# Patient Record
Sex: MALE | Race: WHITE | NOT HISPANIC OR LATINO | Employment: OTHER
[De-identification: names, ages, dates, MRNs, and addresses within clinical notes are randomized per-mention and may not be internally consistent; named-entity substitution may affect disease eponyms.]

---

## 2021-07-09 ENCOUNTER — RECORDS - HEALTHEAST (OUTPATIENT)
Dept: ADMINISTRATIVE | Facility: OTHER | Age: 76
End: 2021-07-09

## 2021-07-11 PROBLEM — E11.621 DIABETIC ULCER OF RIGHT HEEL ASSOCIATED WITH TYPE 2 DIABETES MELLITUS, LIMITED TO BREAKDOWN OF SKIN (H): Status: ACTIVE | Noted: 2021-07-07

## 2021-07-11 PROBLEM — L97.411 DIABETIC ULCER OF RIGHT HEEL ASSOCIATED WITH TYPE 2 DIABETES MELLITUS, LIMITED TO BREAKDOWN OF SKIN (H): Status: ACTIVE | Noted: 2021-07-07

## 2021-07-28 ENCOUNTER — OFFICE VISIT (OUTPATIENT)
Dept: VASCULAR SURGERY | Facility: CLINIC | Age: 76
End: 2021-07-28
Attending: PODIATRIST
Payer: MEDICARE

## 2021-07-28 VITALS — RESPIRATION RATE: 16 BRPM | HEART RATE: 60 BPM | DIASTOLIC BLOOD PRESSURE: 60 MMHG | SYSTOLIC BLOOD PRESSURE: 110 MMHG

## 2021-07-28 DIAGNOSIS — E11.621 DIABETIC ULCER OF RIGHT HEEL ASSOCIATED WITH TYPE 2 DIABETES MELLITUS, LIMITED TO BREAKDOWN OF SKIN (H): Primary | ICD-10-CM

## 2021-07-28 DIAGNOSIS — L97.411 DIABETIC ULCER OF RIGHT HEEL ASSOCIATED WITH TYPE 2 DIABETES MELLITUS, LIMITED TO BREAKDOWN OF SKIN (H): Primary | ICD-10-CM

## 2021-07-28 PROCEDURE — 11042 DBRDMT SUBQ TIS 1ST 20SQCM/<: CPT | Performed by: PODIATRIST

## 2021-07-28 ASSESSMENT — PAIN SCALES - GENERAL: PAINLEVEL: MODERATE PAIN (5)

## 2021-07-28 NOTE — PROGRESS NOTES
FOOT AND ANKLE SURGERY/PODIATRY Progress Note      ASSESSMENT: Diabetic Ulceration right heel        TREATMENT:  -The right heel ulceration is stable. I recommend continued limited walking with use of medihoney.     -After discussion of risk factors and consent obtained 2% Lidocaine HCL jelly was applied, under clean conditions, the right and foot ulceration(s) were debrided using .into the subcutaneous tissue.  Devitalized and nonviable tissue, along with any fibrin and slough, was removed to improve granulation tissue formation, stimulate wound healing, decrease overall bacteria load, disrupt biofilm formation and decrease edge senescence. Wound drainage was scant No. Total excisional debridement was 0.2 sq cm into the subcutaneous tissue with a depth of 0.2 cm.   Ulcers were improved afterwards and .  Measures were as noted on the flow sheet. Medi-honey with a gauze dressing was applied. He will continue to apply Medi-honey with a gauze dressing qoday.    -He will follow-up in 3 weeks.    Wilbur Falcon DPM  [facility]      HPI: Ulises Swenson was seen again today for a right heel ulceration. Since his last visit, he has tried to remain limited walking on the right foot.      No past medical history on file.    Past Surgical History:   Procedure Laterality Date     ARTHROSCOPY KNEE Right      COLECTOMY SUBTOTAL       HERNIA REPAIR, UMBILICAL      2x     PROSTATECTOMY         Allergies   Allergen Reactions     Lexapro [Escitalopram] Nausea and Vomiting     Sulfa (Sulfonamide Antibiotics) [Sulfa Drugs] Other (See Comments)         Current Outpatient Medications:      ALLERGY RELIEF, LORATADINE, 10 mg tablet, [ALLERGY RELIEF, LORATADINE, 10 MG TABLET] Take 10 mg by mouth daily., Disp: , Rfl:      amLODIPine (NORVASC) 10 MG tablet, [AMLODIPINE (NORVASC) 10 MG TABLET] Take 10 mg by mouth daily. As directed, Disp: , Rfl:      COMBIVENT RESPIMAT  mcg/actuation Mist inhaler,  [COMBIVENT RESPIMAT  MCG/ACTUATION MIST INHALER] INHALE 1 PUFF BY MOUTH 4 TIMES A DAY AS NEEDED, Disp: , Rfl:      fluticasone propionate (FLONASE) 50 mcg/actuation nasal spray, [FLUTICASONE PROPIONATE (FLONASE) 50 MCG/ACTUATION NASAL SPRAY] Spray 2 spray into both nostrils twice a day as needed Angle to your sinuses, Disp: , Rfl:      furosemide (LASIX) 40 MG tablet, [FUROSEMIDE (LASIX) 40 MG TABLET] TAKE 2 TABLETS BY MOUTH once daily AS NEEDED, Disp: , Rfl:      levothyroxine (SYNTHROID, LEVOTHROID) 100 MCG tablet, [LEVOTHYROXINE (SYNTHROID, LEVOTHROID) 100 MCG TABLET] Take 100 mcg by mouth daily., Disp: , Rfl:      lisinopriL (PRINIVIL,ZESTRIL) 40 MG tablet, [LISINOPRIL (PRINIVIL,ZESTRIL) 40 MG TABLET] Take 40 mg by mouth 2 (two) times a day., Disp: , Rfl:      meloxicam (MOBIC) 7.5 MG tablet, [MELOXICAM (MOBIC) 7.5 MG TABLET] Take 7.5 mg by mouth daily., Disp: , Rfl:      metOLazone (ZAROXOLYN) 5 MG tablet, [METOLAZONE (ZAROXOLYN) 5 MG TABLET] TAKE 1 TABLET BY MOUTH EVERY MORNING AS NEEDED, Disp: , Rfl:      metoprolol tartrate (LOPRESSOR) 25 MG tablet, [METOPROLOL TARTRATE (LOPRESSOR) 25 MG TABLET] Take 25 mg by mouth 2 (two) times a day., Disp: , Rfl:      sertraline (ZOLOFT) 25 MG tablet, [SERTRALINE (ZOLOFT) 25 MG TABLET] TAKE 1/2 TABLET BY MOUTH DAILY FOR 1 WEEK, THEN 1 TABLET DAILY FOR 1 WEEK, THEN TAKE 2 TABLETS once daily FOR 1 WEEK THEREAFTER, Disp: , Rfl:      simvastatin (ZOCOR) 20 MG tablet, [SIMVASTATIN (ZOCOR) 20 MG TABLET] Take 1 tablet by mouth every night, Disp: , Rfl:      spironolactone (ALDACTONE) 25 MG tablet, [SPIRONOLACTONE (ALDACTONE) 25 MG TABLET] Take 25 mg by mouth daily., Disp: , Rfl:      terazosin (HYTRIN) 5 MG capsule, [TERAZOSIN (HYTRIN) 5 MG CAPSULE] take 1 tablet (5 mg) by oral route once daily at bedtime, Disp: , Rfl:      VENTOLIN HFA 90 mcg/actuation inhaler, [VENTOLIN HFA 90 MCG/ACTUATION INHALER] Inhale 2 puffs every 4 (four) hours as needed., Disp: , Rfl:       warfarin ANTICOAGULANT (COUMADIN/JANTOVEN) 7.5 MG tablet, [WARFARIN ANTICOAGULANT (COUMADIN/JANTOVEN) 7.5 MG TABLET] TAKE 1 TABLET (7.5mg) BY MOUTH EVERY DAY EXCEPT TAKE 1/2 TABLET (3.75mg) ON wednsedays AND saturdays OR as directed by  clinic, Disp: , Rfl:     Review of Systems - 10 point Review of Systems is negative except for right heel ulcer which is noted in HPI.      OBJECTIVE:  /60   Pulse 60   Resp 16   General appearance: Patient is alert and fully cooperative with history & exam.  No sign of distress is noted during the visit.    Vascular: Dorsalis pedis palpableRight.  Dermatologic:    VASC Wound right heel  (Active)   Pre Size Length 0.4 07/28/21 1500   Pre Size Width 0.5 07/28/21 1500   Pre Size Depth 0.1 07/28/21 1500   Pre Total Sq cm 0.2 07/28/21 1500   Granular base right heel ulcer. No erythema right foot.   Neurologic: Diminished to light touch Right.  Musculoskeletal: Contracted digits noted Right.    Imaging:     No results found.       Picture:

## 2021-07-28 NOTE — PATIENT INSTRUCTIONS
Limited walking      Wound Care Instructions    every 2 days Cleanse your right heel  wound(s) with Normal Saline or Wound Cleanser    Pat dry with non-sterile gauze      Apply medihoney into/onto the wounds    Cover with gauze    Secure with roll gauze as needed      It is not ok to get your wound wet in the bath or shower      SEEK MEDICAL CARE IF:    You have an increase in swelling, pain, or redness around the wound.    You have an increase in the amount of pus coming from the wound.    There is a bad smell coming from the wound.    The wound appears to be worsening/enlarging    You have a fever greater than 101.5 F

## 2021-07-28 NOTE — LETTER
2021      [facility]   Fax: [phone] Wound Dressing Rx and Order Form  Customer Service: [phone] Order Status: new    Verbal: yareli          Patient Info:  Name: Ulises Swenson  : 1945  Address: [address] [place] [id number]  .[unfilled]         Insurance Info:  INSURER: Payor: MEDICARE / Plan: MEDICARE / Product Type: Medicare /   Re: Prior Authorization Request  Patient: Ulises Swenson  Policy ID#:  [id number]  : 1945      Physician Info:   Name: kayla hernandez   Dept Address/Phones:   [address] [place] [address], [address] [address] [phone]  Fax: [phone]      Impression:   Encounter Diagnoses   Name Primary?     Diabetic ulcer of right heel associated with type 2 diabetes mellitus, limited to breakdown of skin (H) Yes       Lymphedema circumferential measurements (in cm):              Wound info:           Drainage: moderate    Thickness:  full  Duration of Need: 30 days   Days Supply: 30days  Start Date: 2021  Starter Kit:yes  Qualifying wound/Debridement: yes     Dressing Type Brand Size Number of pieces Frequency of change   Primary manuka honey hd supra lite  2x2 2 Every other day     Square gauze  4x4 1 loaf Every 2 days     Soft form rolled gauze  4x75 15 Every other day     Paper tape   1'' 3 rolls  Every other day      Note: If total out of pocket is more than $50.00 please contact the patient before processing order.     OK to forward to covered supplier.     Electronically Signed Physician: KAYLA HERNANDEZ                         Date: 2021     43

## 2021-08-04 ENCOUNTER — TELEPHONE (OUTPATIENT)
Dept: VASCULAR SURGERY | Facility: CLINIC | Age: 76
End: 2021-08-04

## 2021-08-04 NOTE — TELEPHONE ENCOUNTER
Patients wife is calling requesting more dressings.  She called the company and they stated the current order is for a one time supply.

## 2021-08-09 NOTE — TELEPHONE ENCOUNTER
[employer] is calling stating they haven't received the order from Dr. Falcon for supplies please refax to [phone]

## 2021-08-19 ENCOUNTER — OFFICE VISIT (OUTPATIENT)
Dept: VASCULAR SURGERY | Facility: CLINIC | Age: 76
End: 2021-08-19
Attending: PODIATRIST
Payer: MEDICARE

## 2021-08-19 VITALS — HEART RATE: 76 BPM | RESPIRATION RATE: 20 BRPM | DIASTOLIC BLOOD PRESSURE: 68 MMHG | SYSTOLIC BLOOD PRESSURE: 124 MMHG

## 2021-08-19 DIAGNOSIS — L97.411 DIABETIC ULCER OF RIGHT HEEL ASSOCIATED WITH TYPE 2 DIABETES MELLITUS, LIMITED TO BREAKDOWN OF SKIN (H): Primary | ICD-10-CM

## 2021-08-19 DIAGNOSIS — E11.621 DIABETIC ULCER OF RIGHT HEEL ASSOCIATED WITH TYPE 2 DIABETES MELLITUS, LIMITED TO BREAKDOWN OF SKIN (H): Primary | ICD-10-CM

## 2021-08-19 PROCEDURE — 11042 DBRDMT SUBQ TIS 1ST 20SQCM/<: CPT | Performed by: PODIATRIST

## 2021-08-19 ASSESSMENT — PAIN SCALES - GENERAL: PAINLEVEL: SEVERE PAIN (6)

## 2021-08-19 NOTE — PROGRESS NOTES
FOOT AND ANKLE SURGERY/PODIATRY Progress Note      ASSESSMENT: Diabetic Ulceration right heel        TREATMENT:  -The right heel ulceration is stable. I recommend continued limited walking with use of medihoney.     -After discussion of risk factors and consent obtained 2% Lidocaine HCL jelly was applied and 6 ml 1% lidocaine plain was injected to the right heel, under clean conditions, the right and foot ulceration(s) were debrided using .into the subcutaneous tissue.  Devitalized and nonviable tissue, along with any fibrin and slough, was removed to improve granulation tissue formation, stimulate wound healing, decrease overall bacteria load, disrupt biofilm formation and decrease edge senescence. Wound drainage was scant No. Total excisional debridement was 0.2 sq cm into the subcutaneous tissue with a depth of 0.2 cm.   Ulcers were improved afterwards and .  Measures were as noted on the flow sheet. Medi-honey with a gauze dressing was applied. He will continue to apply Medi-honey with a gauze dressing qoday.    -He will follow-up in 3 weeks.    Wilbur Falcon DPM  [facility] [place]      HPI: Ulises Swenson was seen again today for a right heel ulceration. Since his last visit, he has tried to remain limited walking on the right foot.      No past medical history on file.    Past Surgical History:   Procedure Laterality Date     ARTHROSCOPY KNEE Right      COLECTOMY SUBTOTAL       HERNIA REPAIR, UMBILICAL      2x     PROSTATECTOMY         Allergies   Allergen Reactions     Lexapro [Escitalopram] Nausea and Vomiting     Sulfa (Sulfonamide Antibiotics) [Sulfa Drugs] Other (See Comments)         Current Outpatient Medications:      ALLERGY RELIEF, LORATADINE, 10 mg tablet, [ALLERGY RELIEF, LORATADINE, 10 MG TABLET] Take 10 mg by mouth daily., Disp: , Rfl:      amLODIPine (NORVASC) 10 MG tablet, [AMLODIPINE (NORVASC) 10 MG TABLET] Take 10 mg by mouth daily. As directed, Disp: , Rfl:       COMBIVENT RESPIMAT  mcg/actuation Mist inhaler, [COMBIVENT RESPIMAT  MCG/ACTUATION MIST INHALER] INHALE 1 PUFF BY MOUTH 4 TIMES A DAY AS NEEDED, Disp: , Rfl:      fluticasone propionate (FLONASE) 50 mcg/actuation nasal spray, [FLUTICASONE PROPIONATE (FLONASE) 50 MCG/ACTUATION NASAL SPRAY] Spray 2 spray into both nostrils twice a day as needed Angle to your sinuses, Disp: , Rfl:      furosemide (LASIX) 40 MG tablet, [FUROSEMIDE (LASIX) 40 MG TABLET] TAKE 2 TABLETS BY MOUTH once daily AS NEEDED, Disp: , Rfl:      levothyroxine (SYNTHROID, LEVOTHROID) 100 MCG tablet, [LEVOTHYROXINE (SYNTHROID, LEVOTHROID) 100 MCG TABLET] Take 100 mcg by mouth daily., Disp: , Rfl:      lisinopriL (PRINIVIL,ZESTRIL) 40 MG tablet, [LISINOPRIL (PRINIVIL,ZESTRIL) 40 MG TABLET] Take 40 mg by mouth 2 (two) times a day., Disp: , Rfl:      meloxicam (MOBIC) 7.5 MG tablet, [MELOXICAM (MOBIC) 7.5 MG TABLET] Take 7.5 mg by mouth daily., Disp: , Rfl:      metOLazone (ZAROXOLYN) 5 MG tablet, [METOLAZONE (ZAROXOLYN) 5 MG TABLET] TAKE 1 TABLET BY MOUTH EVERY MORNING AS NEEDED, Disp: , Rfl:      metoprolol tartrate (LOPRESSOR) 25 MG tablet, [METOPROLOL TARTRATE (LOPRESSOR) 25 MG TABLET] Take 25 mg by mouth 2 (two) times a day., Disp: , Rfl:      sertraline (ZOLOFT) 25 MG tablet, [SERTRALINE (ZOLOFT) 25 MG TABLET] TAKE 1/2 TABLET BY MOUTH DAILY FOR 1 WEEK, THEN 1 TABLET DAILY FOR 1 WEEK, THEN TAKE 2 TABLETS once daily FOR 1 WEEK THEREAFTER, Disp: , Rfl:      simvastatin (ZOCOR) 20 MG tablet, [SIMVASTATIN (ZOCOR) 20 MG TABLET] Take 1 tablet by mouth every night, Disp: , Rfl:      spironolactone (ALDACTONE) 25 MG tablet, [SPIRONOLACTONE (ALDACTONE) 25 MG TABLET] Take 25 mg by mouth daily., Disp: , Rfl:      terazosin (HYTRIN) 5 MG capsule, [TERAZOSIN (HYTRIN) 5 MG CAPSULE] take 1 tablet (5 mg) by oral route once daily at bedtime, Disp: , Rfl:      VENTOLIN HFA 90 mcg/actuation inhaler, [VENTOLIN HFA 90 MCG/ACTUATION INHALER] Inhale 2 puffs  every 4 (four) hours as needed., Disp: , Rfl:      warfarin ANTICOAGULANT (COUMADIN/JANTOVEN) 7.5 MG tablet, [WARFARIN ANTICOAGULANT (COUMADIN/JANTOVEN) 7.5 MG TABLET] TAKE 1 TABLET (7.5mg) BY MOUTH EVERY DAY EXCEPT TAKE 1/2 TABLET (3.75mg) ON wednsedays AND saturdays OR as directed by  clinic, Disp: , Rfl:     Review of Systems - 10 point Review of Systems is negative except for right heel ulcer which is noted in HPI.      OBJECTIVE:  /68   Pulse 76   Resp 20   General appearance: Patient is alert and fully cooperative with history & exam.  No sign of distress is noted during the visit.    Vascular: Dorsalis pedis palpableRight.  Dermatologic:    VASC Wound right heel  (Active)   Pre Size Length 0.4 07/28/21 1500   Pre Size Width 0.5 07/28/21 1500   Pre Size Depth 0.1 07/28/21 1500   Pre Total Sq cm 0.2 07/28/21 1500   Granular base right heel ulcer with undermining. No erythema right foot.   Neurologic: Diminished to light touch Right.  Musculoskeletal: Contracted digits noted Right.    Imaging:     No results found.       Picture:
